# Patient Record
Sex: FEMALE | Race: WHITE | NOT HISPANIC OR LATINO | URBAN - METROPOLITAN AREA
[De-identification: names, ages, dates, MRNs, and addresses within clinical notes are randomized per-mention and may not be internally consistent; named-entity substitution may affect disease eponyms.]

---

## 2019-01-27 ENCOUNTER — TELEPHONE (OUTPATIENT)
Dept: URGENT CARE | Facility: CLINIC | Age: 24
End: 2019-01-27

## 2019-01-27 NOTE — TELEPHONE ENCOUNTER
[facility] called and was wanting to double check the prescription. I advised the pharmacy after speaking with Dr. Torres that it was supposed to be 1 tablet twice a day. Pharmacy stated they would let them know.